# Patient Record
Sex: FEMALE | Race: WHITE | NOT HISPANIC OR LATINO | Employment: OTHER | URBAN - METROPOLITAN AREA
[De-identification: names, ages, dates, MRNs, and addresses within clinical notes are randomized per-mention and may not be internally consistent; named-entity substitution may affect disease eponyms.]

---

## 2021-02-19 ENCOUNTER — HOSPITAL ENCOUNTER (EMERGENCY)
Facility: CLINIC | Age: 46
Discharge: HOME | End: 2021-02-19
Attending: EMERGENCY MEDICINE | Admitting: EMERGENCY MEDICINE
Payer: COMMERCIAL

## 2021-02-19 ENCOUNTER — APPOINTMENT (OUTPATIENT)
Dept: GENERAL RADIOLOGY | Facility: CLINIC | Age: 46
End: 2021-02-19
Attending: EMERGENCY MEDICINE
Payer: COMMERCIAL

## 2021-02-19 VITALS
TEMPERATURE: 99.6 F | RESPIRATION RATE: 14 BRPM | SYSTOLIC BLOOD PRESSURE: 119 MMHG | DIASTOLIC BLOOD PRESSURE: 74 MMHG | HEART RATE: 68 BPM | WEIGHT: 120 LBS | OXYGEN SATURATION: 100 % | BODY MASS INDEX: 21.26 KG/M2 | HEIGHT: 63 IN

## 2021-02-19 DIAGNOSIS — R42 LIGHTHEADEDNESS: ICD-10-CM

## 2021-02-19 DIAGNOSIS — R00.2 PALPITATIONS: ICD-10-CM

## 2021-02-19 LAB
ANION GAP SERPL CALCULATED.3IONS-SCNC: 5 MMOL/L (ref 3–14)
BASOPHILS # BLD AUTO: 0 10E9/L (ref 0–0.2)
BASOPHILS NFR BLD AUTO: 0.4 %
BUN SERPL-MCNC: 10 MG/DL (ref 7–30)
CALCIUM SERPL-MCNC: 8.9 MG/DL (ref 8.5–10.1)
CHLORIDE SERPL-SCNC: 109 MMOL/L (ref 94–109)
CO2 SERPL-SCNC: 27 MMOL/L (ref 20–32)
CREAT SERPL-MCNC: 0.77 MG/DL (ref 0.52–1.04)
DIFFERENTIAL METHOD BLD: NORMAL
EOSINOPHIL # BLD AUTO: 0 10E9/L (ref 0–0.7)
EOSINOPHIL NFR BLD AUTO: 0.3 %
ERYTHROCYTE [DISTWIDTH] IN BLOOD BY AUTOMATED COUNT: 11.2 % (ref 10–15)
GFR SERPL CREATININE-BSD FRML MDRD: >90 ML/MIN/{1.73_M2}
GLUCOSE SERPL-MCNC: 91 MG/DL (ref 70–99)
HCG SERPL QL: NEGATIVE
HCT VFR BLD AUTO: 40.1 % (ref 35–47)
HGB BLD-MCNC: 13.7 G/DL (ref 11.7–15.7)
IMM GRANULOCYTES # BLD: 0 10E9/L (ref 0–0.4)
IMM GRANULOCYTES NFR BLD: 0.3 %
INTERPRETATION ECG - MUSE: NORMAL
LYMPHOCYTES # BLD AUTO: 1.6 10E9/L (ref 0.8–5.3)
LYMPHOCYTES NFR BLD AUTO: 22.2 %
MCH RBC QN AUTO: 32.2 PG (ref 26.5–33)
MCHC RBC AUTO-ENTMCNC: 34.2 G/DL (ref 31.5–36.5)
MCV RBC AUTO: 94 FL (ref 78–100)
MONOCYTES # BLD AUTO: 0.4 10E9/L (ref 0–1.3)
MONOCYTES NFR BLD AUTO: 5.3 %
NEUTROPHILS # BLD AUTO: 5.1 10E9/L (ref 1.6–8.3)
NEUTROPHILS NFR BLD AUTO: 71.5 %
NRBC # BLD AUTO: 0 10*3/UL
NRBC BLD AUTO-RTO: 0 /100
PLATELET # BLD AUTO: 236 10E9/L (ref 150–450)
POTASSIUM SERPL-SCNC: 4 MMOL/L (ref 3.4–5.3)
RBC # BLD AUTO: 4.26 10E12/L (ref 3.8–5.2)
SODIUM SERPL-SCNC: 141 MMOL/L (ref 133–144)
TROPONIN I SERPL-MCNC: <0.015 UG/L (ref 0–0.04)
TSH SERPL DL<=0.005 MIU/L-ACNC: 1.94 MU/L (ref 0.4–4)
WBC # BLD AUTO: 7.2 10E9/L (ref 4–11)

## 2021-02-19 PROCEDURE — 84703 CHORIONIC GONADOTROPIN ASSAY: CPT | Performed by: EMERGENCY MEDICINE

## 2021-02-19 PROCEDURE — 71046 X-RAY EXAM CHEST 2 VIEWS: CPT

## 2021-02-19 PROCEDURE — 85025 COMPLETE CBC W/AUTO DIFF WBC: CPT | Performed by: EMERGENCY MEDICINE

## 2021-02-19 PROCEDURE — 80048 BASIC METABOLIC PNL TOTAL CA: CPT | Performed by: EMERGENCY MEDICINE

## 2021-02-19 PROCEDURE — 84443 ASSAY THYROID STIM HORMONE: CPT | Performed by: EMERGENCY MEDICINE

## 2021-02-19 PROCEDURE — 84484 ASSAY OF TROPONIN QUANT: CPT | Performed by: EMERGENCY MEDICINE

## 2021-02-19 PROCEDURE — 93005 ELECTROCARDIOGRAM TRACING: CPT

## 2021-02-19 PROCEDURE — 99285 EMERGENCY DEPT VISIT HI MDM: CPT | Mod: 25

## 2021-02-19 ASSESSMENT — MIFFLIN-ST. JEOR: SCORE: 1168.45

## 2021-02-19 NOTE — ED PROVIDER NOTES
History   Chief Complaint:  Palpitations       The history is provided by the patient.      Evelia Mcnulty is a 45 year old female who presents with palpitations. She notes she has occasional episodes of nausea, lightheadedness, and jittery sensation which have been coming and going for at least a week. She has also had palpitations which she describes as a pounding in her chest that she needs to 'breath through.' She notes she has had this in the past but has no formal diagnosis. She denies any pain in her chest or elsewhere in her body. She has no leg swelling or history DVT/pulmonary embolism. She has no diarrhea or vomiting. She also reports some fatigue, but notes significant life stressors at this time which she attributes to this. She has been working with her primary care provider and therapist on some of these difficulties. She states that she initially pushed through all of these symptoms, but is concerned about the recurrent nature of her symptoms--her palpitations in particular. There are no exacerbating or alleviating factors for any of these symptoms, and she has been continuing her kick boxing classes with no change her in symptoms. She does note that her menstrual cycle began today, and she has had worsening symptoms leading up to this. She was seen at [facility] earlier today and was referred to the ER for testing.  She wonders whether her symptoms may be attributable to stress.    Review of Systems   All other systems reviewed and are negative.    Allergies:  The patient has no known allergies.     Medications:  No daily medication.     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  Presents to the ER unaccompanied.     Physical Exam     Patient Vitals for the past 24 hrs:   BP Temp Temp src Pulse Resp SpO2 Height Weight   02/19/21 1745 119/74 -- -- 68 14 100 % -- --   02/19/21 1730 108/71 -- -- 64 14 100 % -- --   02/19/21 1715 111/71 -- -- 65 15 100 % -- --   02/19/21 1700 121/71 -- -- -- -- -- -- --   02/19/21 1645 -- -- -- 82 11 99 % -- --   02/19/21 1630 109/72 -- -- 66 15 95 % -- --   02/19/21 1615 103/71 -- -- 64 14 100 % -- --   02/19/21 1600 120/73 -- -- 70 11 100 % -- --   02/19/21 1327 138/65 99.6  F (37.6  C) Temporal 71 18 100 % 1.6 m (5' 3\") 54.4 kg (120 lb)     Physical Exam  General: Nontoxic-appearing woman sitting upright in room 5  HENT: mucous membranes moist, thyroid nonpalpable  CV: rate as above, regular rhythm, no lower extremity edema, no JVD, palpable symmetric radial pulses, no murmur audible  Resp: normal effort, speaks in full phrases, no stridor, no cough observed  GI: abdomen soft and nontender  MSK: no bony tenderness   Skin: appropriately warm and dry  Neuro: awake, alert, clear speech, fully oriented, face symmetric,  normal, strength and sensation intact in all extr, no nuchal rigidity, ambulatory without ataxia  Psych: cooperative, pleasant    Emergency Department Course   ECG  ECG taken at 1326, ECG read at 1514  Normal sinus rhythm   Rate 75 bpm. MA interval 126 ms. QRS duration 70 ms. QT/QTc 368/410 ms. P-R-T axes 73 50 71.     Imaging:  XR Chest 2 Views:   IMPRESSION: No acute cardiopulmonary disease.   Reading per radiology     Laboratory:   CBC: WBC 7.2, HGB 13.7,    BMP: WNL (Creatinine 0.77)   Troponin (Collected 1503): <0.015  TSH with free T4 reflex: 1.94  HCG qualitative: negative    Emergency Department Course:    Reviewed:  I reviewed nursing notes, vitals, past medical history and care everywhere    Assessments:  2550 I obtained history and examined the patient as noted above.   1747 I rechecked the " patient and explained findings.     Disposition:  The patient was discharged to home.       Impression & Plan   Medical Decision Making:  Differential diagnosis included a variety of arrhythmias, ectopy, pericarditis, acute coronary syndrome, metabolic abnormality, infection, and many others.  Fortunately, her work-up here is reassuring.  She is negative by PERC criteria which makes pulmonary embolism extremely unlikely.  She desired reassurance that nothing serious was going on, and in fact the results of her evaluation are compatible with this.  However, diagnostic uncertainty was acknowledged to the patient and recommendation for outpatient follow-up, which she already intends to do through her primary, was recommended.  She should return here for sudden worsening at any hour.      Diagnosis:    ICD-10-CM    1. Palpitations  R00.2    2. Lightheadedness  R42        Scribe Disclosure:  I, Sofia Nation, am serving as a scribe at 3:16 PM on 2/19/2021 to document services personally performed by Ángel Brizuela MD based on my observations and the provider's statements to me.     This note was completed in part using Dragon voice recognition software. Although reviewed after completion, some word and grammatical errors may occur.      Ángel Brizuela MD  02/19/21 8566

## 2021-02-19 NOTE — ED TRIAGE NOTES
C/o palpitations on/off for the past week. Denies cp or sob. Feeling lightheaded on/off as well. Sts she has been stressed and not sore if its related.

## 2024-09-05 ENCOUNTER — HOSPITAL ENCOUNTER (OUTPATIENT)
Dept: MAMMOGRAPHY | Facility: CLINIC | Age: 49
Discharge: HOME | End: 2024-09-05
Attending: PHYSICIAN ASSISTANT | Admitting: PHYSICIAN ASSISTANT
Payer: COMMERCIAL

## 2024-09-05 DIAGNOSIS — N63.20 MASS OF LEFT BREAST: ICD-10-CM

## 2024-09-05 PROCEDURE — 250N000009 HC RX 250: Performed by: RADIOLOGY

## 2024-09-05 PROCEDURE — 76942 ECHO GUIDE FOR BIOPSY: CPT

## 2024-09-05 RX ADMIN — LIDOCAINE HYDROCHLORIDE 5 ML: 10 INJECTION, SOLUTION INFILTRATION; PERINEURAL at 15:09

## 2025-04-09 ENCOUNTER — HOSPITAL ENCOUNTER (EMERGENCY)
Facility: CLINIC | Age: 50
Discharge: HOME | End: 2025-04-09
Attending: EMERGENCY MEDICINE | Admitting: EMERGENCY MEDICINE
Payer: COMMERCIAL

## 2025-04-09 VITALS
RESPIRATION RATE: 16 BRPM | TEMPERATURE: 98.1 F | BODY MASS INDEX: 23.92 KG/M2 | HEART RATE: 75 BPM | SYSTOLIC BLOOD PRESSURE: 136 MMHG | WEIGHT: 135 LBS | DIASTOLIC BLOOD PRESSURE: 88 MMHG | HEIGHT: 63 IN | OXYGEN SATURATION: 100 %

## 2025-04-09 DIAGNOSIS — S83.91XA SPRAIN OF RIGHT KNEE, UNSPECIFIED LIGAMENT, INITIAL ENCOUNTER: ICD-10-CM

## 2025-04-09 DIAGNOSIS — S82.831A CLOSED FRACTURE OF DISTAL END OF RIGHT FIBULA, UNSPECIFIED FRACTURE MORPHOLOGY, INITIAL ENCOUNTER: ICD-10-CM

## 2025-04-09 PROCEDURE — 27786 TREATMENT OF ANKLE FRACTURE: CPT | Mod: RT

## 2025-04-09 PROCEDURE — 99284 EMERGENCY DEPT VISIT MOD MDM: CPT | Mod: 25

## 2025-04-09 PROCEDURE — 250N000013 HC RX MED GY IP 250 OP 250 PS 637: Performed by: EMERGENCY MEDICINE

## 2025-04-09 RX ORDER — IBUPROFEN 600 MG/1
600 TABLET, FILM COATED ORAL ONCE
Status: COMPLETED | OUTPATIENT
Start: 2025-04-09 | End: 2025-04-09

## 2025-04-09 RX ADMIN — IBUPROFEN 600 MG: 600 TABLET ORAL at 17:35

## 2025-04-09 ASSESSMENT — ACTIVITIES OF DAILY LIVING (ADL): ADLS_ACUITY_SCORE: 41

## 2025-04-09 NOTE — ED TRIAGE NOTES
Patient presents to the ER via EMS. Per report, patient comes from home where she fell off of her front step and twisted her right knee, right ankle and right hip.     Pt report right ankle and right knee pain      Triage Assessment (Adult)       Row Name 04/09/25 1669          Triage Assessment    Airway WDL WDL        Respiratory WDL    Respiratory WDL WDL        Skin Circulation/Temperature WDL    Skin Circulation/Temperature WDL WDL        Cardiac WDL    Cardiac WDL WDL        Peripheral/Neurovascular WDL    Peripheral Neurovascular WDL X        Cognitive/Neuro/Behavioral WDL    Cognitive/Neuro/Behavioral WDL WDL

## 2025-04-09 NOTE — ED PROVIDER NOTES
"  Emergency Department Note      History of Present Illness     Chief Complaint   Fall      ALMAZ Mcnulty is a 49 year old female presenting to the ED via EMS following a fall. She states that just prior to arrival, while descending her front stoop she stepped awkwardly, rolling her right ankle and falling forward onto her right knee. In the ED she endorses pain to the lateral aspect of the right ankle as well as pain in the right knee. The ankle pain is more bothersome than the knee. She denies any head injury, hip pain, neck pain, or back pain as result of the fall. She denies any pain in the right foot. She states she underwent right knee surgery with [facility] following a patellar fracture roughly five years ago, and since then she has had decreased mobility of the knee.     Independent Historian   None    Review of External Notes   None    Past Medical History     Medical History and Problem List   Alopecia  Calculus of kidney  Celiac disease  Vitamin D deficiency  Vitamin B12 deficiency   Endometriosis  Breast mass    Medications   Salicylic acid    Surgical History   Cholecystectomy  Lithotripsy  Laparoscopic surgery for endometriosis  Breast biopsy  ORIF patellar fracture    Physical Exam     Patient Vitals for the past 24 hrs:   BP Temp Temp src Pulse Resp SpO2 Height Weight   04/09/25 1730 136/88 98.1  F (36.7  C) Temporal 75 16 100 % 1.6 m (5' 3\") 61.2 kg (135 lb)     Physical Exam  Nursing note and vitals reviewed.  Constitutional:  Oriented to person, place, and time. Cooperative.   HENT:   Nose:    Nose normal.   Mouth/Throat:   Mucous membranes are normal.   Eyes:    Conjunctivae normal and EOM are normal.      Pupils are equal, round, and reactive to light.   Neck:    Trachea normal.   Cardiovascular:  Normal rate, regular rhythm, normal heart sounds and normal pulses. No murmur heard.  Pulmonary/Chest:  Effort normal and breath sounds normal.   Abdominal:   Soft. Normal appearance and bowel sounds " are normal.      There is no tenderness.      There is no rebound and no CVA tenderness.   Musculoskeletal:  Swelling and tenderness to palpation to the lateral malleolus of the right ankle.  No tenderness to palpation over the proximal right tib-fib region, the medial malleolus of the right ankle, or the right foot.  There is some swelling and tenderness to palpation to the medial aspect of the right knee but no ligamentous laxity present.  Extremities otherwise atraumatic x 4.   Lymphadenopathy:  No cervical adenopathy.   Neurological:   Alert and oriented to person, place, and time. Normal strength.      No cranial nerve deficit or sensory deficit. GCS eye subscore is 4. GCS verbal subscore is 5. GCS motor subscore is 6.   Skin:    Skin is intact. No rash noted.   Psychiatric:   Normal mood and affect.    Diagnostics     Lab Results   Labs Ordered and Resulted from Time of ED Arrival to Time of ED Departure - No data to display    Imaging   Ankle XR, G/E 3 views, right   Final Result   IMPRESSION: Acute transverse nondisplaced fracture of the lateral malleolus with mild overlying soft tissue swelling. No additional fractures. The ankle mortise is intact.      XR Knee Right 3 Views   Final Result   IMPRESSION: Healed fracture of the patella with indwelling screw and wire fixation. Ossific fragment along the superior patellar pole which is age-indeterminate. If there is high clinical concern for acute fracture, consider cross-sectional imaging.    Trace right knee effusion. Joint spaces are maintained.        Independent Interpretation   I independently interpreted the patient's right ankle x-rays and she appears to have a distal fibula fracture.  I also independently interpreted the patient's right knee x-rays and I do not appreciate any acute fractures there.    ED Course      Medications Administered   Medications   ibuprofen (ADVIL/MOTRIN) tablet 600 mg (600 mg Oral $Given 4/9/25 3624)     Procedures    Procedures     Discussion of Management   None    ED Course   ED Course as of 04/09/25 1812 Wed Apr 09, 2025   5709 I obtained history and examined the patient as noted above.       Additional Documentation  None    Medical Decision Making / Diagnosis     CMS Diagnoses: None    MIPS       None    [facility]   Evelia Mcnulty is a 49 year old female who came in by ambulance for further evaluation of her right ankle and right knee injury.  She had the above x-rays obtained, which showed the distal fibular fracture.  She also likely did sprain the knee, but she does not appear to have any ligamentous laxity there.  I suspect this is a nonoperative fracture of the ankle, and she was placed in a postoperative boot as well as an Ace wrap to the right knee.  She has crutches at home to use.  I recommended she be nonweightbearing at this point until she follows up with [facility].  I also recommended ice and elevation as well as ibuprofen and Tylenol.  She should return with any concerns or worsening symptoms as well.    Disposition   The patient was discharged.     Diagnosis     ICD-10-CM    1. Closed fracture of distal end of right fibula, unspecified fracture morphology, initial encounter  S82.831A       2. Sprain of right knee, unspecified ligament, initial encounter  S83.91XA            Discharge Medications   New Prescriptions    No medications on file     Scribe Disclosure:  I, DAISY PLATT, am serving as a scribe at 6:12 PM on 4/9/2025 to document services personally performed by Eulogio Hurtado MD based on my observations and the provider's statements to me.        Eulogio Hurtado MD  04/09/25 1959